# Patient Record
Sex: FEMALE | Race: BLACK OR AFRICAN AMERICAN | Employment: OTHER | URBAN - NONMETROPOLITAN AREA
[De-identification: names, ages, dates, MRNs, and addresses within clinical notes are randomized per-mention and may not be internally consistent; named-entity substitution may affect disease eponyms.]

---

## 2022-11-28 ENCOUNTER — HOSPITAL ENCOUNTER (OUTPATIENT)
Age: 87
Setting detail: SPECIMEN
Discharge: HOME | End: 2022-11-28

## 2022-11-28 LAB — SARS-COV-2, PCR: NOT DETECTED

## 2022-11-28 PROCEDURE — U0005 INFEC AGEN DETEC AMPLI PROBE: HCPCS

## 2022-11-28 PROCEDURE — U0003 INFECTIOUS AGENT DETECTION BY NUCLEIC ACID (DNA OR RNA); SEVERE ACUTE RESPIRATORY SYNDROME CORONAVIRUS 2 (SARS-COV-2) (CORONAVIRUS DISEASE [COVID-19]), AMPLIFIED PROBE TECHNIQUE, MAKING USE OF HIGH THROUGHPUT TECHNOLOGIES AS DESCRIBED BY CMS-2020-01-R: HCPCS

## 2022-11-29 PROBLEM — G30.1 DEMENTIA OF THE ALZHEIMER'S TYPE, WITH LATE ONSET, WITH DELIRIUM (HCC): Status: ACTIVE | Noted: 2022-11-29

## 2022-11-29 PROBLEM — F05 DEMENTIA OF THE ALZHEIMER'S TYPE, WITH LATE ONSET, WITH DELIRIUM (HCC): Status: ACTIVE | Noted: 2022-11-29

## 2022-11-29 PROBLEM — F02.80 DEMENTIA OF THE ALZHEIMER'S TYPE, WITH LATE ONSET, WITH DELIRIUM (HCC): Status: ACTIVE | Noted: 2022-11-29

## 2022-11-29 PROBLEM — F02.82 DEMENTIA OF THE ALZHEIMER'S TYPE, WITH LATE ONSET, WITH DELIRIUM (HCC): Status: ACTIVE | Noted: 2022-11-29

## 2024-08-03 ENCOUNTER — HOSPITAL ENCOUNTER (INPATIENT)
Age: 89
LOS: 5 days | Discharge: HOSPICE | End: 2024-08-08
Attending: INTERNAL MEDICINE | Admitting: INTERNAL MEDICINE
Payer: MEDICARE

## 2024-08-03 PROCEDURE — 6560000002 HC HOSPICE GENERAL INPATIENT

## 2024-08-03 RX ORDER — LATANOPROST 50 UG/ML
1 SOLUTION/ DROPS OPHTHALMIC NIGHTLY
COMMUNITY

## 2024-08-03 RX ORDER — AMLODIPINE BESYLATE 5 MG/1
5 TABLET ORAL DAILY
Status: DISCONTINUED | OUTPATIENT
Start: 2024-08-04 | End: 2024-08-08 | Stop reason: HOSPADM

## 2024-08-03 RX ORDER — ACETAMINOPHEN 325 MG/1
650 TABLET ORAL EVERY 4 HOURS PRN
Status: DISCONTINUED | OUTPATIENT
Start: 2024-08-03 | End: 2024-08-08 | Stop reason: HOSPADM

## 2024-08-03 RX ORDER — AMLODIPINE BESYLATE 5 MG/1
5 TABLET ORAL DAILY
COMMUNITY

## 2024-08-03 RX ORDER — LATANOPROST 50 UG/ML
1 SOLUTION/ DROPS OPHTHALMIC NIGHTLY
Status: DISCONTINUED | OUTPATIENT
Start: 2024-08-03 | End: 2024-08-08 | Stop reason: HOSPADM

## 2024-08-03 RX ORDER — LOPERAMIDE HYDROCHLORIDE 2 MG/1
2 CAPSULE ORAL PRN
Status: DISCONTINUED | OUTPATIENT
Start: 2024-08-03 | End: 2024-08-08 | Stop reason: HOSPADM

## 2024-08-03 RX ADMIN — LATANOPROST 1 DROP: 50 SOLUTION/ DROPS OPHTHALMIC at 22:01

## 2024-08-03 NOTE — HOSPICE PLAN OF CARE
Hospice Plan of Care  Date: 08/03/24  Time: 5:40 PM    ___________________    Patient: Christopher Barton  No coverages linked to the hospice account.  MRN: [id number]      Hospice Election Date:  Current Benefit Period: No benefit period information found          Medical Director: Dr. Deon Key  Hospice Attending Provider: Dr. Deon Key    Level of Care:  Respite    Services Needed:  Skilled nursing, medical , personal care aide, spiritual care    Services Provided:      ___________________    Diagnoses:  There were no encounter diagnoses.    Current Medications:    Current Facility-Administered Medications:     loperamide (IMODIUM) capsule 2 mg, 2 mg, Oral, PRN, Deon Key MD    [START ON 8/4/2024] amLODIPine (NORVASC) tablet 5 mg, 5 mg, Oral, Daily, Deon Key MD    latanoprost (XALATAN) 0.005 % ophthalmic solution 1 drop (Patient Supplied), 1 drop, Both Eyes, Nightly, Deon Key MD      Pain Scale: None - Denies Pain  Pain Score:      Pain Plan:  Acetaminophen PRN    Orders:  Orders Placed This Encounter   Procedures    Elevate Head of Bed      Elevate 30 Degrees.     Standing Status:   Standing     Number of Occurrences:   1    Oral care     Every 2 hours as needed.     Standing Status:   Standing     Number of Occurrences:   1    May turn or reposition for patient comfort     Standing Status:   Standing     Number of Occurrences:   13423    Do Not Resuscitate     Standing Status:   Standing     Number of Occurrences:   1    Initiate Oxygen Therapy Protocol for Patient Comfort Care     Initiate a trial of oxygen therapy for actively dying patient if the patient has increased work of breathing or requests oxygen therapy because of breathlessness or chest pain.  To initiate oxygen therapy: nurse or RT enters oxygen for patient comfort order from the Comfort Care Focused Order Set using Per Protocol without Cosign order mode.  Do not check or monitor oxygen saturation.  If the patient  experiences more discomfort with the oxygen therapy, then discontinue the therapy.     Standing Status:   Standing     Number of Occurrences:   29680    Place in Outpatient in a Bed     Standing Status:   Standing     Number of Occurrences:   1     Order Specific Question:   Telemetry/Cardiac Monitoring Required?     Answer:   No       Allergies:  Allergies   Allergen Reactions    Nsaids Other (See Comments)     unknown    Penicillins Hives    Sulfa Antibiotics Hives       Care Plan:  Encounter Problems (Active)       Problem: ABCDS Injury Assessment       Dates: Start:  08/03/24       Disciplines: Interdisciplinary      Goal: Absence of physical injury       Dates: Start:  08/03/24    Expected End:  08/05/24       Disciplines: Interdisciplinary            Problem: Skin/Tissue Integrity       Dates: Start:  08/03/24       Disciplines: Interdisciplinary      Goal: Absence of new skin breakdown       Dates: Start:  08/03/24    Expected End:  08/05/24       Description: 1.  Monitor for areas of redness and/or skin breakdown  2.  Assess vascular access sites hourly  3.  Every 4-6 hours minimum:  Change oxygen saturation probe site  4.  Every 4-6 hours:  If on nasal continuous positive airway pressure, respiratory therapy assess nares and determine need for appliance change or resting period.    Disciplines: Interdisciplinary           Care Plan Problems/Goals       0 of 2 Goals Met 0 of 2 Met       No Outcome (2)        Absence of physical injury (ABCDS Injury Assessment)      Absence of new skin breakdown (Skin/Tissue Integrity)                          ___________________    Care Team Notes       POC/IDG Notes    No notes of this type exist for this encounter.

## 2024-08-03 NOTE — PROGRESS NOTES
Patient arrived at 1635 via stretcher with [facility]. Patient transferred to bed easily without incident. No distress noted as patient was moved from her sheets to ours, brief was changed and patient made comfortable in the bed. Patient denies pain at this time. Patient in room next to nurse's station, call light in hand. Bed locked in lowest position at this time.

## 2024-08-04 PROCEDURE — 6370000000 HC RX 637 (ALT 250 FOR IP): Performed by: INTERNAL MEDICINE

## 2024-08-04 PROCEDURE — 6560000002 HC HOSPICE GENERAL INPATIENT

## 2024-08-04 RX ADMIN — AMLODIPINE BESYLATE 5 MG: 5 TABLET ORAL at 08:52

## 2024-08-04 RX ADMIN — LATANOPROST 1 DROP: 50 SOLUTION/ DROPS OPHTHALMIC at 21:06

## 2024-08-04 NOTE — PROGRESS NOTES
Patient alert, oriented to self and place at this time. Patient pleasant, ate breakfast unassisted and took morning medication without difficulty. Denies any needs at this time.

## 2024-08-04 NOTE — PROGRESS NOTES
SN present at the patient's bedside to assess the patient's pain level and restlessness. Patient is alert with no signs or symptoms of pain or agitation noted. No family is present at the bedside. Will continue to monitor.

## 2024-08-04 NOTE — PROGRESS NOTES
Patient asleep with sheet pulled over her head during rounds with offgoing staff. Respirations even and unlabored, bed locked in lowest position.

## 2024-08-04 NOTE — PROGRESS NOTES
Dr. Aden in [facility]- no changes to patient's status, remains respite. No new orders received.

## 2024-08-05 PROCEDURE — 6560000002 HC HOSPICE GENERAL INPATIENT

## 2024-08-05 RX ADMIN — LATANOPROST 1 DROP: 50 SOLUTION/ DROPS OPHTHALMIC at 21:05

## 2024-08-05 NOTE — PROGRESS NOTES
Rounding with change of shift report.  Patient resting in bed with eyes closed, respirations nonlabored.  FLACC 0.  Safety precautions in place of bed in low position, locked.  Upper side rails x 2 raised.  Call light within reach.

## 2024-08-05 NOTE — PROGRESS NOTES
Administered patient her scheduled eyedrops.  At first, patient does not allow eyedrops to be administered but then changes mind and allows nurse.  Patient incontinent and brief changed, incontinent care provided with Rae ADAMS.  Assessment.

## 2024-08-06 PROCEDURE — 6560000002 HC HOSPICE GENERAL INPATIENT

## 2024-08-06 PROCEDURE — 6370000000 HC RX 637 (ALT 250 FOR IP): Performed by: INTERNAL MEDICINE

## 2024-08-06 RX ADMIN — AMLODIPINE BESYLATE 5 MG: 5 TABLET ORAL at 09:10

## 2024-08-06 RX ADMIN — ACETAMINOPHEN 650 MG: 325 TABLET ORAL at 04:08

## 2024-08-06 RX ADMIN — LATANOPROST 1 DROP: 50 SOLUTION/ DROPS OPHTHALMIC at 21:56

## 2024-08-06 ASSESSMENT — PAIN DESCRIPTION - LOCATION
LOCATION: HIP
LOCATION: HIP

## 2024-08-06 ASSESSMENT — PAIN DESCRIPTION - ORIENTATION
ORIENTATION: LEFT
ORIENTATION: RIGHT

## 2024-08-06 ASSESSMENT — PAIN SCALES - GENERAL: PAINLEVEL_OUTOF10: 3

## 2024-08-06 NOTE — PROGRESS NOTES
Patient allowed eye drops to be administered.  She will often get angry and refuse, but was quiet, pleasant tonight.

## 2024-08-06 NOTE — PROGRESS NOTES
Patient grimaced.  Stated her hip was hurting her.  Pain per flacc of a 2.  Tylenol 650 mg given at this time. Took crushed in applesauce.  Will monitor.

## 2024-08-06 NOTE — PROGRESS NOTES
FOLLOW UP TO TYLENOL GIVEN AT 0409:   Pain per flacc is 0/10.  Resting quietly eyes closed, no grimacing.

## 2024-08-06 NOTE — PROGRESS NOTES
Patient asleep in bed during rounds with offgoing shift. Respirations even and unlabored, bed locked in lowest position. Bed close to nurses station.

## 2024-08-06 NOTE — PROGRESS NOTES
Dr. Key made rounds through the [facility], patient remains hospice, no new orders received.

## 2024-08-07 PROCEDURE — 6370000000 HC RX 637 (ALT 250 FOR IP): Performed by: INTERNAL MEDICINE

## 2024-08-07 PROCEDURE — 6560000002 HC HOSPICE GENERAL INPATIENT

## 2024-08-07 RX ADMIN — AMLODIPINE BESYLATE 5 MG: 5 TABLET ORAL at 08:52

## 2024-08-07 RX ADMIN — LATANOPROST 1 DROP: 50 SOLUTION/ DROPS OPHTHALMIC at 20:36

## 2024-08-07 NOTE — PROGRESS NOTES
Rounding with change of shift report.  Patient resting with eyes closed, respirations nonlabored.  FLACC 0.  Safety precautions in place of bed in low position, locked.  Upper side rails x 2 raised.

## 2024-08-07 NOTE — CARE COORDINATION
The pt is sitting up in bed.  She denies pain.  She expresses acosta and seems to be at peace. No needs identified.  No family present.  Will follow.      08/07/24 0840   Encounter Summary   Encounter Overview/Reason Follow-up   Service Provided For Patient   Referral/Consult From Hospice   Support System Family members   Last Encounter  08/06/24   Complexity of Encounter Low   Begin Time 0840   End Time  0900   Total Time Calculated 20 min   Spiritual/Emotional needs   Type Spiritual Support   Advance Care Planning   Type   (The pt is a DNR)   Assessment/Intervention/Outcome   Assessment Coping;Calm   Intervention Active listening;Prayer (assurance of)/[place];Sustaining Presence/Ministry of presence;Nurtured Hope   Outcome Coping;Comfort;Peace;Other (comment)  (expresses gratitude.)   Plan and Referrals   Does the patient have a [facility] PCP? Yes    to follow up after discharge? No

## 2024-08-07 NOTE — PROGRESS NOTES
Patient resting in bed awake. Call light in reach. Bed in low, locked position with side rails up x 3.

## 2024-08-07 NOTE — PROGRESS NOTES
Patient incont  of urine, brief changed. Positioned patient to right side. HOB up. Call light in reach. Bed in low, locked position with side rails up x 3. Denies pain at present time.

## 2024-08-08 VITALS
SYSTOLIC BLOOD PRESSURE: 140 MMHG | OXYGEN SATURATION: 96 % | RESPIRATION RATE: 18 BRPM | DIASTOLIC BLOOD PRESSURE: 86 MMHG | HEART RATE: 62 BPM | TEMPERATURE: 96.3 F

## 2024-08-08 PROCEDURE — 6370000000 HC RX 637 (ALT 250 FOR IP): Performed by: INTERNAL MEDICINE

## 2024-08-08 RX ADMIN — AMLODIPINE BESYLATE 5 MG: 5 TABLET ORAL at 09:09

## 2024-08-08 NOTE — CARE COORDINATION
08/08/24 0840   Encounter Summary   Encounter Overview/Reason Follow-up   Service Provided For Patient   Referral/Consult From Hospice  (Respite pt.)   Support System Hospice;Family members   Last Encounter  08/07/24   Complexity of Encounter Low   Begin Time 0840   End Time  0900   Total Time Calculated 20 min   Spiritual/Emotional needs   Type Spiritual Support   Advance Care Planning   Type   (The pt is a DNR)   Assessment/Intervention/Outcome   Assessment Coping;Calm;Other (Comment)  (Limited response.)   Intervention Prayer (assurance of)/[place];Sustaining Presence/Ministry of presence;Nurtured Hope   Outcome Comfort;Coping   Plan and Referrals   Plan/Referrals Continue to visit, (comment)  (The is scheduled to dc home with Hospice today..)   Does the patient have a [facility] PCP? Yes    to follow up after discharge? No

## 2024-08-08 NOTE — PROGRESS NOTES
Pt is awake resting comfortably with FLACC of 0, has no symptoms of distress and has no needs at this time.  Pt's bed in lowest position, locked with siderails raised x 3, call light within reach, and frequent checks by staff.

## 2024-08-08 NOTE — PROGRESS NOTES
Bedside rounds and reports given by night nurses. Patient resting in bed awake. HOB up. FLACC-O. Call light in reach. Bed in low, locked position with side rails up x 3.Patient going home today per ambulance.

## 2024-08-08 NOTE — PROGRESS NOTES
Pt is resting in bed with eyes open. No distress noted. Pt had no needs at this time. Safety measures in place.

## 2024-09-05 ENCOUNTER — HOSPITAL ENCOUNTER (INPATIENT)
Age: 89
LOS: 5 days | Discharge: HOSPICE | DRG: 951 | End: 2024-09-10
Attending: INTERNAL MEDICINE | Admitting: INTERNAL MEDICINE
Payer: MEDICARE

## 2024-09-05 PROBLEM — F02.80 ALZHEIMER'S DISEASE WITH LATE ONSET (HCC): Status: ACTIVE | Noted: 2024-09-05

## 2024-09-05 PROBLEM — G30.1 ALZHEIMER'S DISEASE WITH LATE ONSET (HCC): Status: ACTIVE | Noted: 2024-09-05

## 2024-09-05 LAB — SARS-COV-2 RDRP RESP QL NAA+PROBE: NOT DETECTED

## 2024-09-05 PROCEDURE — 6550000002 HC HOSPICE INPATIENT RESPITE

## 2024-09-05 PROCEDURE — 6370000000 HC RX 637 (ALT 250 FOR IP): Performed by: INTERNAL MEDICINE

## 2024-09-05 PROCEDURE — 87635 SARS-COV-2 COVID-19 AMP PRB: CPT

## 2024-09-05 PROCEDURE — 2700000000 HC OXYGEN THERAPY PER DAY

## 2024-09-05 RX ORDER — HALOPERIDOL 2 MG/ML
1 SOLUTION ORAL EVERY 6 HOURS PRN
Status: DISCONTINUED | OUTPATIENT
Start: 2024-09-05 | End: 2024-09-10 | Stop reason: HOSPADM

## 2024-09-05 RX ORDER — BISACODYL 10 MG
10 SUPPOSITORY, RECTAL RECTAL DAILY PRN
Status: DISCONTINUED | OUTPATIENT
Start: 2024-09-05 | End: 2024-09-10 | Stop reason: HOSPADM

## 2024-09-05 RX ORDER — TETRAHYDROZOLINE HCL 0.05 %
2 DROPS OPHTHALMIC (EYE) 2 TIMES DAILY
Status: DISCONTINUED | OUTPATIENT
Start: 2024-09-05 | End: 2024-09-10 | Stop reason: HOSPADM

## 2024-09-05 RX ORDER — PROCHLORPERAZINE MALEATE 10 MG
10 TABLET ORAL EVERY 6 HOURS PRN
Status: DISCONTINUED | OUTPATIENT
Start: 2024-09-05 | End: 2024-09-10 | Stop reason: HOSPADM

## 2024-09-05 RX ORDER — LORAZEPAM 0.5 MG/1
0.5 TABLET ORAL EVERY 6 HOURS PRN
Status: DISCONTINUED | OUTPATIENT
Start: 2024-09-05 | End: 2024-09-10 | Stop reason: HOSPADM

## 2024-09-05 RX ORDER — ACETAMINOPHEN 160 MG/5ML
500 LIQUID ORAL EVERY 4 HOURS PRN
Status: DISCONTINUED | OUTPATIENT
Start: 2024-09-05 | End: 2024-09-10 | Stop reason: HOSPADM

## 2024-09-05 RX ORDER — LOPERAMIDE HCL 2 MG
2 CAPSULE ORAL
Status: DISCONTINUED | OUTPATIENT
Start: 2024-09-05 | End: 2024-09-10 | Stop reason: HOSPADM

## 2024-09-05 RX ORDER — MORPHINE SULFATE 20 MG/ML
5 SOLUTION ORAL
Status: DISCONTINUED | OUTPATIENT
Start: 2024-09-05 | End: 2024-09-10 | Stop reason: HOSPADM

## 2024-09-05 RX ORDER — LATANOPROST 50 UG/ML
1 SOLUTION/ DROPS OPHTHALMIC NIGHTLY
Status: DISCONTINUED | OUTPATIENT
Start: 2024-09-05 | End: 2024-09-10 | Stop reason: HOSPADM

## 2024-09-05 RX ORDER — AMLODIPINE BESYLATE 5 MG/1
5 TABLET ORAL DAILY
Status: DISCONTINUED | OUTPATIENT
Start: 2024-09-06 | End: 2024-09-10 | Stop reason: HOSPADM

## 2024-09-05 RX ORDER — ACETAMINOPHEN 650 MG/1
650 SUPPOSITORY RECTAL DAILY PRN
Status: DISCONTINUED | OUTPATIENT
Start: 2024-09-05 | End: 2024-09-10 | Stop reason: HOSPADM

## 2024-09-05 RX ADMIN — TETRAHYDROZOLINE HCL 2 DROP: 0.05 SOLUTION/ DROPS OPHTHALMIC at 20:53

## 2024-09-05 RX ADMIN — LATANOPROST 1 DROP: 50 SOLUTION OPHTHALMIC at 20:33

## 2024-09-06 PROCEDURE — 6550000002 HC HOSPICE INPATIENT RESPITE

## 2024-09-06 PROCEDURE — 2700000000 HC OXYGEN THERAPY PER DAY

## 2024-09-06 PROCEDURE — 6370000000 HC RX 637 (ALT 250 FOR IP): Performed by: INTERNAL MEDICINE

## 2024-09-06 RX ADMIN — AMLODIPINE BESYLATE 5 MG: 5 TABLET ORAL at 09:31

## 2024-09-06 RX ADMIN — TETRAHYDROZOLINE HCL 2 DROP: 0.05 SOLUTION/ DROPS OPHTHALMIC at 21:21

## 2024-09-06 RX ADMIN — TETRAHYDROZOLINE HCL 2 DROP: 0.05 SOLUTION/ DROPS OPHTHALMIC at 09:31

## 2024-09-06 RX ADMIN — LATANOPROST 1 DROP: 50 SOLUTION OPHTHALMIC at 21:21

## 2024-09-07 PROCEDURE — 6370000000 HC RX 637 (ALT 250 FOR IP): Performed by: INTERNAL MEDICINE

## 2024-09-07 PROCEDURE — 6550000002 HC HOSPICE INPATIENT RESPITE

## 2024-09-07 RX ADMIN — TETRAHYDROZOLINE HCL 2 DROP: 0.05 SOLUTION/ DROPS OPHTHALMIC at 21:38

## 2024-09-07 RX ADMIN — TETRAHYDROZOLINE HCL 2 DROP: 0.05 SOLUTION/ DROPS OPHTHALMIC at 09:55

## 2024-09-07 RX ADMIN — AMLODIPINE BESYLATE 5 MG: 5 TABLET ORAL at 09:55

## 2024-09-07 RX ADMIN — LATANOPROST 1 DROP: 50 SOLUTION OPHTHALMIC at 21:38

## 2024-09-07 ASSESSMENT — PAIN SCALES - GENERAL
PAINLEVEL_OUTOF10: 0

## 2024-09-08 PROCEDURE — 6550000002 HC HOSPICE INPATIENT RESPITE

## 2024-09-08 PROCEDURE — 6370000000 HC RX 637 (ALT 250 FOR IP): Performed by: INTERNAL MEDICINE

## 2024-09-08 RX ADMIN — LATANOPROST 1 DROP: 50 SOLUTION OPHTHALMIC at 21:34

## 2024-09-08 RX ADMIN — AMLODIPINE BESYLATE 5 MG: 5 TABLET ORAL at 08:12

## 2024-09-08 RX ADMIN — TETRAHYDROZOLINE HCL 2 DROP: 0.05 SOLUTION/ DROPS OPHTHALMIC at 21:34

## 2024-09-08 RX ADMIN — TETRAHYDROZOLINE HCL 2 DROP: 0.05 SOLUTION/ DROPS OPHTHALMIC at 08:12

## 2024-09-09 PROCEDURE — 6370000000 HC RX 637 (ALT 250 FOR IP): Performed by: INTERNAL MEDICINE

## 2024-09-09 PROCEDURE — 6550000002 HC HOSPICE INPATIENT RESPITE

## 2024-09-09 RX ADMIN — TETRAHYDROZOLINE HCL 2 DROP: 0.05 SOLUTION/ DROPS OPHTHALMIC at 08:33

## 2024-09-09 RX ADMIN — AMLODIPINE BESYLATE 5 MG: 5 TABLET ORAL at 08:33

## 2024-09-09 RX ADMIN — BISACODYL 10 MG: 10 SUPPOSITORY RECTAL at 13:31

## 2024-09-09 RX ADMIN — LATANOPROST 1 DROP: 50 SOLUTION OPHTHALMIC at 22:24

## 2024-09-10 VITALS
SYSTOLIC BLOOD PRESSURE: 94 MMHG | RESPIRATION RATE: 16 BRPM | OXYGEN SATURATION: 97 % | HEART RATE: 110 BPM | TEMPERATURE: 97 F | DIASTOLIC BLOOD PRESSURE: 76 MMHG

## 2024-09-10 PROCEDURE — 6370000000 HC RX 637 (ALT 250 FOR IP): Performed by: INTERNAL MEDICINE

## 2024-09-10 RX ADMIN — AMLODIPINE BESYLATE 5 MG: 5 TABLET ORAL at 09:31

## 2024-09-10 RX ADMIN — TETRAHYDROZOLINE HCL 2 DROP: 0.05 SOLUTION/ DROPS OPHTHALMIC at 09:32

## 2024-09-10 ASSESSMENT — PAIN SCALES - GENERAL
PAINLEVEL_OUTOF10: 0

## 2025-03-04 ENCOUNTER — HOSPITAL ENCOUNTER (INPATIENT)
Age: 89
LOS: 5 days | Discharge: HOSPICE | DRG: 951 | End: 2025-03-09
Attending: INTERNAL MEDICINE | Admitting: INTERNAL MEDICINE
Payer: MEDICARE

## 2025-03-04 LAB — SARS-COV-2 RDRP RESP QL NAA+PROBE: NOT DETECTED

## 2025-03-04 PROCEDURE — 87635 SARS-COV-2 COVID-19 AMP PRB: CPT

## 2025-03-04 PROCEDURE — 6550000002 HC HOSPICE INPATIENT RESPITE

## 2025-03-04 PROCEDURE — 6370000000 HC RX 637 (ALT 250 FOR IP): Performed by: INTERNAL MEDICINE

## 2025-03-04 RX ORDER — PROCHLORPERAZINE MALEATE 10 MG
10 TABLET ORAL EVERY 6 HOURS PRN
Status: DISCONTINUED | OUTPATIENT
Start: 2025-03-04 | End: 2025-03-09 | Stop reason: HOSPADM

## 2025-03-04 RX ORDER — MORPHINE SULFATE 20 MG/ML
5 SOLUTION ORAL
Status: DISCONTINUED | OUTPATIENT
Start: 2025-03-04 | End: 2025-03-09 | Stop reason: HOSPADM

## 2025-03-04 RX ORDER — ACETAMINOPHEN 650 MG/1
650 SUPPOSITORY RECTAL EVERY 4 HOURS PRN
Status: DISCONTINUED | OUTPATIENT
Start: 2025-03-04 | End: 2025-03-09 | Stop reason: HOSPADM

## 2025-03-04 RX ORDER — TETRAHYDROZOLINE HCL 0.05 %
2 DROPS OPHTHALMIC (EYE) 2 TIMES DAILY
Status: DISCONTINUED | OUTPATIENT
Start: 2025-03-04 | End: 2025-03-09 | Stop reason: HOSPADM

## 2025-03-04 RX ORDER — HALOPERIDOL 2 MG/ML
1 SOLUTION ORAL EVERY 6 HOURS PRN
Status: DISCONTINUED | OUTPATIENT
Start: 2025-03-04 | End: 2025-03-09 | Stop reason: HOSPADM

## 2025-03-04 RX ORDER — LORAZEPAM 0.5 MG/1
0.5 TABLET ORAL EVERY 6 HOURS PRN
Status: DISCONTINUED | OUTPATIENT
Start: 2025-03-04 | End: 2025-03-09 | Stop reason: HOSPADM

## 2025-03-04 RX ORDER — LATANOPROST 50 UG/ML
1 SOLUTION/ DROPS OPHTHALMIC NIGHTLY
Status: DISCONTINUED | OUTPATIENT
Start: 2025-03-04 | End: 2025-03-09 | Stop reason: HOSPADM

## 2025-03-04 RX ORDER — BISACODYL 10 MG
10 SUPPOSITORY, RECTAL RECTAL DAILY PRN
Status: DISCONTINUED | OUTPATIENT
Start: 2025-03-04 | End: 2025-03-09 | Stop reason: HOSPADM

## 2025-03-04 RX ORDER — HYOSCYAMINE SULFATE 0.12 MG/1
0.12 TABLET SUBLINGUAL EVERY 4 HOURS PRN
Status: DISCONTINUED | OUTPATIENT
Start: 2025-03-04 | End: 2025-03-09 | Stop reason: HOSPADM

## 2025-03-04 RX ORDER — LOPERAMIDE HYDROCHLORIDE 2 MG/1
2 CAPSULE ORAL
Status: DISCONTINUED | OUTPATIENT
Start: 2025-03-04 | End: 2025-03-09 | Stop reason: HOSPADM

## 2025-03-04 RX ORDER — AMLODIPINE BESYLATE 5 MG/1
10 TABLET ORAL DAILY
Status: DISCONTINUED | OUTPATIENT
Start: 2025-03-05 | End: 2025-03-09 | Stop reason: HOSPADM

## 2025-03-04 RX ORDER — ACETAMINOPHEN 160 MG/5ML
650 LIQUID ORAL EVERY 4 HOURS PRN
Status: DISCONTINUED | OUTPATIENT
Start: 2025-03-04 | End: 2025-03-09 | Stop reason: HOSPADM

## 2025-03-04 RX ADMIN — Medication 2 DROP: at 20:17

## 2025-03-04 RX ADMIN — LATANOPROST 1 DROP: 50 SOLUTION OPHTHALMIC at 20:17

## 2025-03-04 NOTE — PROGRESS NOTES
Pt admitted to Respite from home. She arrived in stable cond. She denied pain. No SOB noted. She can answer simple yes/no questions. She is incont of bowel and bladder and daughter says she normally has 1-3 very soft stools per day. She is also incont of urine. When Brief changed, no odor noted. Urine in brief light in color. 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 Reported by outpt nurse that this is a long term problem and that it has healed and reopened mult times during this admission. Open area is 2cm/2cm in harish. Wound bed clean and pink. No surrounding redness noted. Foam protectant dressing used to protect. Pt is on a [facility] soft diet and req max assist of one with meals. She has frequent history of hypertension. BP on admission 195/67.  Home RN reports this is common for pt and usually controlled with current medications. Pt wears knit hat at all times and came with two. She is not to receive blood pressure readings in right arm.

## 2025-03-04 NOTE — PROGRESS NOTES
Call received from pt's daughter wanting to know if she got to the facility without diff. Assurance given that pt has been placed in room directly across from the nurses' station which she had requested.

## 2025-03-04 NOTE — PROGRESS NOTES
Pt turned and repositioned to left side. Brief dry at present. Pt denied pain. No SOB noted. Pt cont to rest comfortably.

## 2025-03-05 PROCEDURE — 6550000002 HC HOSPICE INPATIENT RESPITE

## 2025-03-05 PROCEDURE — 6370000000 HC RX 637 (ALT 250 FOR IP): Performed by: INTERNAL MEDICINE

## 2025-03-05 RX ADMIN — LATANOPROST 1 DROP: 50 SOLUTION OPHTHALMIC at 20:28

## 2025-03-05 RX ADMIN — Medication 2 DROP: at 20:29

## 2025-03-05 RX ADMIN — Medication 2 DROP: at 08:13

## 2025-03-05 RX ADMIN — AMLODIPINE BESYLATE 10 MG: 5 TABLET ORAL at 08:13

## 2025-03-05 NOTE — PROGRESS NOTES
Rounding with change of shift report from ERNIE Argueta.  Patient has arrived today for respite care.  Patient in bed resting with eyes closed but awakens when nurse speaks to her.  Smiles and answers politely.  Denies pain.  Acknowledges nurse introductions.  Safety precautions in place of bed in low position, locked.  Upper side rails x 2 raised.  Call light within reach.

## 2025-03-05 NOTE — PROGRESS NOTES
Christopher Barton arrived via non-emergent EMS yesterday for a respite stay in [address].  She is awake and is ready to eat her breakfast.  She rested well last night.  ERNIE Rahman came in to feed her.    I tried to call dtr with no success.  She will go home Sunday.

## 2025-03-06 PROCEDURE — 6370000000 HC RX 637 (ALT 250 FOR IP): Performed by: INTERNAL MEDICINE

## 2025-03-06 PROCEDURE — 6550000002 HC HOSPICE INPATIENT RESPITE

## 2025-03-06 RX ORDER — SODIUM PHOSPHATE, DIBASIC AND SODIUM PHOSPHATE, MONOBASIC 7; 19 G/230ML; G/230ML
1 ENEMA RECTAL DAILY PRN
Status: DISCONTINUED | OUTPATIENT
Start: 2025-03-06 | End: 2025-03-09 | Stop reason: HOSPADM

## 2025-03-06 RX ADMIN — BISACODYL 10 MG: 10 SUPPOSITORY RECTAL at 02:10

## 2025-03-06 RX ADMIN — Medication 2 DROP: at 20:30

## 2025-03-06 RX ADMIN — Medication 2 DROP: at 08:02

## 2025-03-06 RX ADMIN — LATANOPROST 1 DROP: 50 SOLUTION OPHTHALMIC at 20:30

## 2025-03-06 RX ADMIN — ACETAMINOPHEN 650 MG: 325 SOLUTION ORAL at 08:02

## 2025-03-06 RX ADMIN — AMLODIPINE BESYLATE 10 MG: 5 TABLET ORAL at 08:02

## 2025-03-06 ASSESSMENT — PAIN DESCRIPTION - ORIENTATION: ORIENTATION: LOWER

## 2025-03-06 ASSESSMENT — PAIN DESCRIPTION - LOCATION: LOCATION: BACK

## 2025-03-06 NOTE — PROGRESS NOTES
Rounded with nightshift nurses,Norma and Caity. Patient is on her left side and peaceful. Safety measures in place.

## 2025-03-06 NOTE — PROGRESS NOTES
rounded in [facility]. No family present at this time. She remains respite level of care.

## 2025-03-06 NOTE — PROGRESS NOTES
Patient wet with urine and keeps oozing stool, SN checked patient for impaction and noted a lot of soft pasty stool, dulcolax suppository given as ordered, patient cleaned and new brief in place. Patient drank 120 ML of apple juice with SN helping her. Turned and repositioned to left side, pillows used for support and to float heels off bed.   Bed in low position/locked and SR up x2, call light within reach.

## 2025-03-06 NOTE — PROGRESS NOTES
Pt is lying in bed, eyes closed, resting quietly in bed. She ate a good breakfast this morning and tolerated it well. She is a slow eater, but with patience she eats almost everything. No needs at this time, Safety precautions in place, bed locked in lowest position, side rails x 2 in place and call light within reach.

## 2025-03-06 NOTE — PROGRESS NOTES
Dr. Key on unit for rounds.  No needs voiced at this time. Pt remains appropriate for respite level of care. Safety precautions in place, bed locked in lowest position, side rails x 2 and call light within reach.

## 2025-03-06 NOTE — PROGRESS NOTES
Scheduled eye drops given as ordered, turned and repositioned with assist per Hermila PCA and SN, brief slightly wet with urine and noted patient oozing stool, patient cleaned and new brief in place. Patient knows name otherwise confused.

## 2025-03-06 NOTE — PROGRESS NOTES
Christopher Barton remains Respite LOC in [address] at the [facility].  She is sleeping when I went to room.  She fed herself her breakfast tray this morning and had a good BM so she is napping now.  She will go home Sunday via non-emergent EMS.  I will call tomorrow to set up this transport.  Her EMS DNR is on file at the nurses' station.

## 2025-03-06 NOTE — PROGRESS NOTES
Patient only had a smear of stool from her dulcolax suppository, wet with urine, cleaned and new brief in place. Turned and repositioned to her left side, pillows used for support and to float heels off bed.

## 2025-03-07 PROCEDURE — 6550000002 HC HOSPICE INPATIENT RESPITE

## 2025-03-07 PROCEDURE — 6370000000 HC RX 637 (ALT 250 FOR IP): Performed by: INTERNAL MEDICINE

## 2025-03-07 RX ADMIN — Medication 2 DROP: at 20:49

## 2025-03-07 RX ADMIN — LATANOPROST 1 DROP: 50 SOLUTION OPHTHALMIC at 20:49

## 2025-03-07 RX ADMIN — Medication 2 DROP: at 09:56

## 2025-03-07 RX ADMIN — SODIUM PHOSPHATE, DIBASIC AND SODIUM PHOSPHATE, MONOBASIC 1 ENEMA: 7; 19 ENEMA RECTAL at 00:05

## 2025-03-07 RX ADMIN — AMLODIPINE BESYLATE 10 MG: 5 TABLET ORAL at 09:56

## 2025-03-07 NOTE — PROGRESS NOTES
Dr Key on [facility] unit.  Pt is sleeping, resting comfortably with no symptoms of distress and she has no new needs.  Pt remains at Respite level of care.

## 2025-03-07 NOTE — PROGRESS NOTES
Christopher Barton remains respite LOC in [address] at the [facility].  She is sleeping soundly with her purple knitted cap on her head.  She appears comfortable and has not yet woken up to eat her breakfast.  I called Le (dtr) to update her and set up plans for discharge on Sunday.  Le states that she is keeping her at her house now ([address] in [place]).  Le stated she would like to go to [facility] and requested non-emergent EMS to bring her home around 3pm so she could be picked up at [facility] on [place] 3/9 @ 2pm.  I called Ankit at [facility] to request this and he verbalized understanding.  He stated if there was a delay he would call us at the [facility] to inform.  I notified Audrey Hinson RN of the above.  [facility] NUMBER [phone] / [phone].  Ankit Padnya is the contact for this set up and his number is [phone].

## 2025-03-07 NOTE — PROGRESS NOTES
Bedside rounds and patient incontinent of urine and conts to ooze stool. Patient cleaned and new brief in place. Turned and repositioned to left side using pillows for support and to float heels off bed.

## 2025-03-07 NOTE — PROGRESS NOTES
Patient conts to ooze small amount of pasty stool and SN gave patient fleets enema. Noted fluid started leaking out immediately, but patient unaware to try and hold fluid in rectal vault. Will continue to monitor.

## 2025-03-07 NOTE — PROGRESS NOTES
Bedside rounds and patient incontinent large amount of urine, patient cleaned, new mepilex to coccyx. Turned and repositioned to left side using pillows for support and to float heels off bed. Oral care given using water to teeth, gums, and lips using oral swab. Patient is pleasantly confused, denies pain or any discomfort. Bed in low position/locked and SR up x2, call light within reach.

## 2025-03-07 NOTE — PROGRESS NOTES
RN to RN bedside rounds and report completed, patient resting quietly with eyes closed. No family present. Bed in low position/locked and SR up x2, call light within reach.

## 2025-03-07 NOTE — PROGRESS NOTES
Pt report and rounding with off going shift staff.  Pt is sleeping with FLACC of 0 and has no symptoms of distress.  Pt's bed in lowest position, locked with side rails raised x 3, room across from nurses desk, and call light within reach.

## 2025-03-08 PROCEDURE — 6550000002 HC HOSPICE INPATIENT RESPITE

## 2025-03-08 PROCEDURE — 6370000000 HC RX 637 (ALT 250 FOR IP): Performed by: INTERNAL MEDICINE

## 2025-03-08 RX ADMIN — Medication 2 DROP: at 07:50

## 2025-03-08 RX ADMIN — LATANOPROST 1 DROP: 50 SOLUTION OPHTHALMIC at 20:16

## 2025-03-08 RX ADMIN — Medication 2 DROP: at 20:16

## 2025-03-08 RX ADMIN — AMLODIPINE BESYLATE 10 MG: 5 TABLET ORAL at 07:50

## 2025-03-08 ASSESSMENT — PAIN SCALES - WONG BAKER: WONGBAKER_NUMERICALRESPONSE: NO HURT

## 2025-03-08 ASSESSMENT — PAIN - FUNCTIONAL ASSESSMENT
PAIN_FUNCTIONAL_ASSESSMENT: NONE - DENIES PAIN
PAIN_FUNCTIONAL_ASSESSMENT: FACE, LEGS, ACTIVITY, CRY, AND CONSOLABILITY (FLACC)

## 2025-03-08 NOTE — PROGRESS NOTES
Pt is currently sleeping, and has slept comfortably all day with only a short time of being awake.  Pt refused breakfast, was awake and fed self during lunch and dinner with 50% of lunch intake and 100% of dinner intake.  Pt had 2 L incontinent episodes of urine and no BM this shift.  Pt's bed is in lowest position, locked with side rails raised x 2, call light within reach, and frequent checks by staff.

## 2025-03-08 NOTE — PROGRESS NOTES
Pt report and rounding with off going shift staff.  Pt is sleeping with FLACC of 0, has no symptoms of distress, and RR even and shallow.  Pt has no needs at this time.  Pt's bed in lowest position, locked with side rails raised x 2, call light within reach, and room across from nurses desk.

## 2025-03-08 NOTE — PROGRESS NOTES
Rounding with change of shift report from Audrey, RN.  Patient resting in bed with eyes closed, respirations unlabored.  FLACC 0.  Safety precautions in place of bed in low position, locked.  Upper side rails x 2 raised.  Call light within reach.

## 2025-03-09 VITALS
OXYGEN SATURATION: 93 % | TEMPERATURE: 98.7 F | HEART RATE: 56 BPM | RESPIRATION RATE: 20 BRPM | SYSTOLIC BLOOD PRESSURE: 194 MMHG | DIASTOLIC BLOOD PRESSURE: 88 MMHG

## 2025-03-09 PROCEDURE — 6370000000 HC RX 637 (ALT 250 FOR IP): Performed by: INTERNAL MEDICINE

## 2025-03-09 RX ADMIN — Medication 2 DROP: at 08:38

## 2025-03-09 RX ADMIN — AMLODIPINE BESYLATE 10 MG: 5 TABLET ORAL at 08:38

## 2025-03-09 NOTE — PROGRESS NOTES
Patient was awake, she was watching television. She is pleasant and cooperative. She was checked for incontinence, noting urination. She was cleansed in perineum, and placed in fresh incontinence brief. Patient tolerated these activities well. She is noted with swelling areas between her legs mid thighs bilaterally. She has mepilex dressing for Stage 2 to coccyx noted. She was repositioned with two staff assist to her left side for comfort with pillow support to her legs, back, head, and arms. Patient states she is comfortable. FLACC 0/10.

## 2025-03-09 NOTE — PROGRESS NOTES
Dr. Aden  in [facility]. Patient remains respite level of care, plan continues to be discharge home today. No new orders received.

## 2025-03-09 NOTE — PROGRESS NOTES
Dr. Aden aware of patient's respite stay. No new needs, no new orders received. Patient remains GIP appropriate, to return home tomorrow 3/9.

## 2025-03-09 NOTE — PROGRESS NOTES
Patient changed for urinary incontinence. Cleansed and fresh brief applied. Swelling noted to mid thighs at this time. Mepilex in place to coccyx. Repositioned at this time to her right side. Denies complaints at this time.

## 2025-03-09 NOTE — PROGRESS NOTES
Patient is sleeping soundly, laying on her side. She is breathing rhythmically and calmly. FLACC 0/10. She has lighting dimmed to room. Call light in reach with television at low volume. HOB at 30 degrees. Patient had received bed bath via PCA assist. She was repositioned with two staff assist to turn to her left side for comfort with pillow support to her back, legs, arms, and head. Bed linens and her gown were changed. She states she is comfortable.

## 2025-06-19 ENCOUNTER — HOSPITAL ENCOUNTER (INPATIENT)
Age: 89
LOS: 5 days | Discharge: HOSPICE | DRG: 951 | End: 2025-06-24
Attending: INTERNAL MEDICINE | Admitting: INTERNAL MEDICINE
Payer: MEDICARE

## 2025-06-19 LAB — SARS-COV-2 RDRP RESP QL NAA+PROBE: NOT DETECTED

## 2025-06-19 PROCEDURE — 6370000000 HC RX 637 (ALT 250 FOR IP): Performed by: INTERNAL MEDICINE

## 2025-06-19 PROCEDURE — 6550000002 HC HOSPICE INPATIENT RESPITE

## 2025-06-19 PROCEDURE — 87635 SARS-COV-2 COVID-19 AMP PRB: CPT

## 2025-06-19 RX ORDER — LORAZEPAM 0.5 MG/1
0.5 TABLET ORAL EVERY 6 HOURS PRN
Status: DISCONTINUED | OUTPATIENT
Start: 2025-06-19 | End: 2025-06-24 | Stop reason: HOSPADM

## 2025-06-19 RX ORDER — LATANOPROST 50 UG/ML
1 SOLUTION/ DROPS OPHTHALMIC NIGHTLY
Status: DISCONTINUED | OUTPATIENT
Start: 2025-06-19 | End: 2025-06-24 | Stop reason: HOSPADM

## 2025-06-19 RX ORDER — PROCHLORPERAZINE MALEATE 10 MG
10 TABLET ORAL EVERY 6 HOURS PRN
Status: DISCONTINUED | OUTPATIENT
Start: 2025-06-19 | End: 2025-06-24 | Stop reason: HOSPADM

## 2025-06-19 RX ORDER — MORPHINE SULFATE 20 MG/ML
5 SOLUTION ORAL
Refills: 0 | Status: DISCONTINUED | OUTPATIENT
Start: 2025-06-19 | End: 2025-06-24 | Stop reason: HOSPADM

## 2025-06-19 RX ORDER — AMLODIPINE BESYLATE 5 MG/1
10 TABLET ORAL DAILY
Status: DISCONTINUED | OUTPATIENT
Start: 2025-06-20 | End: 2025-06-24 | Stop reason: HOSPADM

## 2025-06-19 RX ORDER — ACETAMINOPHEN 650 MG/1
650 SUPPOSITORY RECTAL EVERY 4 HOURS PRN
Status: DISCONTINUED | OUTPATIENT
Start: 2025-06-19 | End: 2025-06-24 | Stop reason: HOSPADM

## 2025-06-19 RX ORDER — LOPERAMIDE HYDROCHLORIDE 2 MG/1
2 CAPSULE ORAL
Status: DISCONTINUED | OUTPATIENT
Start: 2025-06-19 | End: 2025-06-24 | Stop reason: HOSPADM

## 2025-06-19 RX ORDER — ACETAMINOPHEN 160 MG/5ML
500 LIQUID ORAL EVERY 4 HOURS PRN
Status: DISCONTINUED | OUTPATIENT
Start: 2025-06-19 | End: 2025-06-24 | Stop reason: HOSPADM

## 2025-06-19 RX ORDER — TETRAHYDROZOLINE HCL 0.05 %
1 DROPS OPHTHALMIC (EYE) 2 TIMES DAILY
Status: DISCONTINUED | OUTPATIENT
Start: 2025-06-19 | End: 2025-06-24 | Stop reason: HOSPADM

## 2025-06-19 RX ORDER — HALOPERIDOL 2 MG/ML
1 SOLUTION ORAL EVERY 6 HOURS PRN
Status: DISCONTINUED | OUTPATIENT
Start: 2025-06-19 | End: 2025-06-24 | Stop reason: HOSPADM

## 2025-06-19 RX ORDER — BISACODYL 10 MG
10 SUPPOSITORY, RECTAL RECTAL DAILY PRN
Status: DISCONTINUED | OUTPATIENT
Start: 2025-06-19 | End: 2025-06-24 | Stop reason: HOSPADM

## 2025-06-19 RX ORDER — HYOSCYAMINE SULFATE 0.12 MG/1
0.12 TABLET SUBLINGUAL EVERY 4 HOURS PRN
Status: DISCONTINUED | OUTPATIENT
Start: 2025-06-19 | End: 2025-06-24 | Stop reason: HOSPADM

## 2025-06-19 RX ADMIN — LATANOPROST 1 DROP: 50 SOLUTION OPHTHALMIC at 20:57

## 2025-06-19 RX ADMIN — Medication 1 DROP: at 20:57

## 2025-06-19 NOTE — PROGRESS NOTES
Patient arrived to room 7 via EMS transport. She is being admitted to [facility] for respite level of care for [facility] break for 5 days.

## 2025-06-20 PROCEDURE — 6550000002 HC HOSPICE INPATIENT RESPITE

## 2025-06-20 PROCEDURE — 6370000000 HC RX 637 (ALT 250 FOR IP): Performed by: INTERNAL MEDICINE

## 2025-06-20 RX ADMIN — LATANOPROST 1 DROP: 50 SOLUTION OPHTHALMIC at 20:16

## 2025-06-20 RX ADMIN — Medication 1 DROP: at 07:46

## 2025-06-20 RX ADMIN — Medication 1 DROP: at 20:16

## 2025-06-20 RX ADMIN — AMLODIPINE BESYLATE 10 MG: 5 TABLET ORAL at 07:46

## 2025-06-20 NOTE — PROGRESS NOTES
Change of shift report and rounds. Patient resting quietly in bed with eyes closed. Bed in lowest position. Call bell within reach. Upper rails up x 2. Room in front of nursing station.

## 2025-06-20 NOTE — PROGRESS NOTES
Pt napping. No verbal or nonverbal signs of pain or distress noted. Pt fed self lunch and ate aprox 70% of meal.

## 2025-06-20 NOTE — PROGRESS NOTES
Pt feeding self lunch. She is taking an extended time to eat, but seems to be enjoying meal. No verbal or nonverbal signs of pain or distress noted.

## 2025-06-21 PROCEDURE — 6370000000 HC RX 637 (ALT 250 FOR IP): Performed by: INTERNAL MEDICINE

## 2025-06-21 PROCEDURE — 6550000002 HC HOSPICE INPATIENT RESPITE

## 2025-06-21 RX ADMIN — LATANOPROST 1 DROP: 50 SOLUTION OPHTHALMIC at 21:44

## 2025-06-21 RX ADMIN — Medication 1 DROP: at 09:54

## 2025-06-21 RX ADMIN — AMLODIPINE BESYLATE 10 MG: 5 TABLET ORAL at 09:54

## 2025-06-21 RX ADMIN — Medication 1 DROP: at 21:44

## 2025-06-21 RX ADMIN — BISACODYL 10 MG: 10 SUPPOSITORY RECTAL at 02:01

## 2025-06-21 ASSESSMENT — PAIN SCALES - WONG BAKER: WONGBAKER_NUMERICALRESPONSE: NO HURT

## 2025-06-21 NOTE — PROGRESS NOTES
Scheduled eye drops given.  Patient takes well.  Patient is talking to nurse and pleasant.  Will continue to monitor.  Denies pain.

## 2025-06-21 NOTE — PROGRESS NOTES
Change of shift report and rounds. Patient resting quietly in bed. Denies needs at this time. Bed in lowest position and locked. Call bell within reach. Upper rails up x 2. Room in front of nursing station.

## 2025-06-22 PROCEDURE — 6370000000 HC RX 637 (ALT 250 FOR IP): Performed by: INTERNAL MEDICINE

## 2025-06-22 PROCEDURE — 6550000002 HC HOSPICE INPATIENT RESPITE

## 2025-06-22 RX ADMIN — Medication 1 DROP: at 21:00

## 2025-06-22 RX ADMIN — LATANOPROST 1 DROP: 50 SOLUTION OPHTHALMIC at 21:00

## 2025-06-22 RX ADMIN — AMLODIPINE BESYLATE 10 MG: 5 TABLET ORAL at 08:56

## 2025-06-22 RX ADMIN — Medication 1 DROP: at 08:56

## 2025-06-22 ASSESSMENT — PAIN SCALES - WONG BAKER: WONGBAKER_NUMERICALRESPONSE: NO HURT

## 2025-06-22 NOTE — PROGRESS NOTES
Patient was pleasant, wading up her covers on bed. She was checked for incontinence, noting a large bowel movement. She was cleansed with soap and water from incontinence, placing her in fresh brief. She was repositioned on her right side for comfort with pillow support to her back, legs, arms, and head. FLACC 0/10.

## 2025-06-22 NOTE — PROGRESS NOTES
Regularly scheduled medication given at this time.  Patient swallowed pills crushed and mixed in food.  She ate approximately 50% of her breakfast without difficulty. Brief wet and changed at this time. Alia-area cleansed and barrier cream applied to coccyx.  Dry brief applied, patient pulled up in bed and pillows placed under heals.  Side rails up x2, call light within reach, bed in lowest position.  Will monitor.

## 2025-06-22 NOTE — PROGRESS NOTES
Fed patient lunch at this time.  Patient ate approximately 20% of meal, kept falling asleep while eating.  Brief dry at this time.  Denies any pain or discomfort.  No signs or symptoms of distress noted.  Side rails up x2, call light within reach, bed locked and in lowest position. Will continue to monitor.

## 2025-06-22 NOTE — PROGRESS NOTES
Dr Key at [facility] for rounds on patient.  Aware of patient admit for respite care.  Patient remains respite at this time.

## 2025-06-22 NOTE — PROGRESS NOTES
Rounds and bedside report with night shift nurse.  Patient lying in bed with eyes closed.  Respirations even and unlabored.  No signs or symptoms of distress noted.  Side rails up x3, call light within reach, bed in lowest position.

## 2025-06-22 NOTE — PROGRESS NOTES
Patient was eating her kleenex, she spit out contents. She was given snacks from her home stash. She ate two cups full of her puffed corn and drank an apple juice and 4 oz. Of her orange gatorade from the refrigerator. She took about 20 minutes to eat snack and drink her beverage. She was pleasant, watching television. FLACC 0/10.

## 2025-06-23 PROCEDURE — 6370000000 HC RX 637 (ALT 250 FOR IP): Performed by: INTERNAL MEDICINE

## 2025-06-23 PROCEDURE — 6550000002 HC HOSPICE INPATIENT RESPITE

## 2025-06-23 RX ADMIN — LATANOPROST 1 DROP: 50 SOLUTION OPHTHALMIC at 21:20

## 2025-06-23 RX ADMIN — Medication 1 DROP: at 21:20

## 2025-06-23 RX ADMIN — Medication 1 DROP: at 08:15

## 2025-06-23 RX ADMIN — AMLODIPINE BESYLATE 10 MG: 5 TABLET ORAL at 08:15

## 2025-06-23 NOTE — PROGRESS NOTES
Checking on patient on rounds, she was resting quietly, breathing rhythmically and calmly. FLACC 0/10. Lighting was dimmed to the room, television placed on low volume. She did open her eyes and made eye contact with nursing. She was asked if she was in any pain, she said no. She said yes when asked if she was comfortable. Bed is low, rails x 2. Call light next to her on mattress.

## 2025-06-23 NOTE — PROGRESS NOTES
Christopher Barton was brought in via non-emergent EMS on Thursday for a scheduled Hospice Respite stay.  Her discharge date is Tuesday 6/24.  Christopher is sleeping soundly in [address] and has done well during this stay.  I called her dtr Le to discuss discharge and she reports that she has missed her and is ready for her to come home.  She states that she will call Ankit with the [facility] to set up the non-emergent transport home.  She does this regularly. She will request  time for between 2 and 3pm on Tuesday.  I notified the nurses of this as well as outpatient hospice by [facility].  EMS DNR is on file at the nurses' station.

## 2025-06-23 NOTE — PROGRESS NOTES
`Dr. Key on unit for rounds. Care discussed from the pst 24 hours. Pt remains appropriate for Respite level of care. No family at bedside at this time. No needs assessed at this time. Safety precautons in place, bed  is locked in the lowest position, call light within reach with side rails x 3 up.

## 2025-06-23 NOTE — PROGRESS NOTES
Patient was awakened to ask if she wanted a snack for the evening. She was nonverbal, but made eye contact. She was positioned more on her back for eye drop application and to eat/drink something. She ate a cheese puff and was given a drink and held the content of sprite in her mouth for over an hour. She was asked to swallow content or spit it out with cup provided and she was not able to comprehend the instructions. Patient was getting fatigued, but continues to hold fluid in her mouth unswallowed. She is not able to follow directions to swallow fluid or spit it out. Patients daughter called while RN was attempting to get patient to rid the fluids from her mouth. Daughter spoke to PCA Hermila, and instructed staff to give her a cup to spit into. Despite these efforts, patient continues to hold fluids in her mouth. Staff continue to check on her frequently to assist with resolving swallowing issues. She was checked for incontinence, noting a dry brief at this time.

## 2025-06-23 NOTE — PROGRESS NOTES
Rounding and report with offgoing nurses. Care discussed from their shift. FLACC 0/10. No needs at this time. Safety precautions in place, bed locked in lowest position, side rails x 2 in place, and call light within reach.

## 2025-06-24 VITALS
TEMPERATURE: 97.4 F | OXYGEN SATURATION: 95 % | RESPIRATION RATE: 14 BRPM | HEART RATE: 66 BPM | SYSTOLIC BLOOD PRESSURE: 182 MMHG | DIASTOLIC BLOOD PRESSURE: 94 MMHG

## 2025-06-24 PROCEDURE — 6370000000 HC RX 637 (ALT 250 FOR IP): Performed by: INTERNAL MEDICINE

## 2025-06-24 RX ADMIN — AMLODIPINE BESYLATE 10 MG: 5 TABLET ORAL at 09:31

## 2025-06-24 RX ADMIN — Medication 1 DROP: at 09:15

## 2025-06-24 NOTE — PROGRESS NOTES
Christopher Barton is discharging back home today via non-emergent EMS.  They have not yet arrived but are scheduled to between 2:30 and 3pm today.  Le, dtr, called to check on Christopher and I updated her on her condition.  Per nursing, I did advise that she has not eaten well or drank much this stay.  She has had 5 Bms per nursing.  Le voiced understanding.  She will be followed at home by [facility].

## 2025-06-24 NOTE — PROGRESS NOTES
Patient resting with eyes closed at this time. No acute distress noted. Bed locked in lowest position.

## 2025-06-24 NOTE — PROGRESS NOTES
Patient was checked for incontinence, noting that her brief was dry. She denies pain at this time. Her scheduled eye drops were applied. During conclusion of placing eye drops in her eyes, the unit phone rang and her daughter was calling to check on her. Daughter was updated and she is ready to welcome her mom home tomorrow around 2-3pm from [facility]. She does want a phone call when patient is being picked up by ambulance so she will be ready for her arrival.   Patient was turned and repositioned in bed with two staff to left side for comfort. Pillow support to her back, legs, arms, and head. Patient was pleasant and updated that her daughter had called. FLACC 0/10. Patient was watching television with call light on mattress. Lighting dimmed to room with lamp turned on.Door remains open to monitor her from the nurses station.

## 2025-06-24 NOTE — PROGRESS NOTES
Patient resting with eyes closed during rounds with offgoing shift. Bed locked in lowest position.

## 2025-06-24 NOTE — PROGRESS NOTES
Patient was resting supine in bed, with her right hand cupped under her chin. She was watching television, pleasant and willing to answer questions. She states she is not in pain, she did not want a snack at this time. She was asked if she wanted the volume increased on her television and she stated yes. She states yes to being comfortable. FLACC 0/10. She was dry in her incontinence brief. Lighting dimmed to room, curtain pulled closed. Door to room remains open.